# Patient Record
Sex: FEMALE | Race: WHITE | NOT HISPANIC OR LATINO | Employment: UNEMPLOYED | URBAN - METROPOLITAN AREA
[De-identification: names, ages, dates, MRNs, and addresses within clinical notes are randomized per-mention and may not be internally consistent; named-entity substitution may affect disease eponyms.]

---

## 2021-03-06 ENCOUNTER — IMMUNIZATION (OUTPATIENT)
Dept: PRIMARY CARE CLINIC | Facility: CLINIC | Age: 66
End: 2021-03-06
Payer: MEDICAID

## 2021-03-06 DIAGNOSIS — Z23 NEED FOR VACCINATION: Primary | ICD-10-CM

## 2021-03-06 PROCEDURE — 0001A PR IMMUNIZ ADMIN, SARS-COV-2 COVID-19 VACC, 30MCG/0.3ML, 1ST DOSE: ICD-10-PCS | Mod: CV19,S$GLB,, | Performed by: INTERNAL MEDICINE

## 2021-03-06 PROCEDURE — 0001A PR IMMUNIZ ADMIN, SARS-COV-2 COVID-19 VACC, 30MCG/0.3ML, 1ST DOSE: CPT | Mod: CV19,S$GLB,, | Performed by: INTERNAL MEDICINE

## 2021-03-06 PROCEDURE — 91300 PR SARS-COV- 2 COVID-19 VACCINE, NO PRSV, 30MCG/0.3ML, IM: CPT | Mod: S$GLB,,, | Performed by: INTERNAL MEDICINE

## 2021-03-06 PROCEDURE — 91300 PR SARS-COV- 2 COVID-19 VACCINE, NO PRSV, 30MCG/0.3ML, IM: ICD-10-PCS | Mod: S$GLB,,, | Performed by: INTERNAL MEDICINE

## 2021-03-06 RX ADMIN — Medication 0.3 ML: at 12:03

## 2021-03-27 ENCOUNTER — IMMUNIZATION (OUTPATIENT)
Dept: PRIMARY CARE CLINIC | Facility: CLINIC | Age: 66
End: 2021-03-27
Payer: MEDICAID

## 2021-03-27 DIAGNOSIS — Z23 NEED FOR VACCINATION: Primary | ICD-10-CM

## 2021-03-27 PROCEDURE — 91300 PR SARS-COV- 2 COVID-19 VACCINE, NO PRSV, 30MCG/0.3ML, IM: ICD-10-PCS | Mod: S$GLB,,, | Performed by: INTERNAL MEDICINE

## 2021-03-27 PROCEDURE — 0002A PR IMMUNIZ ADMIN, SARS-COV-2 COVID-19 VACC, 30MCG/0.3ML, 2ND DOSE: CPT | Mod: CV19,S$GLB,, | Performed by: INTERNAL MEDICINE

## 2021-03-27 PROCEDURE — 91300 PR SARS-COV- 2 COVID-19 VACCINE, NO PRSV, 30MCG/0.3ML, IM: CPT | Mod: S$GLB,,, | Performed by: INTERNAL MEDICINE

## 2021-03-27 PROCEDURE — 0002A PR IMMUNIZ ADMIN, SARS-COV-2 COVID-19 VACC, 30MCG/0.3ML, 2ND DOSE: ICD-10-PCS | Mod: CV19,S$GLB,, | Performed by: INTERNAL MEDICINE

## 2021-03-27 RX ADMIN — Medication 0.3 ML: at 12:03

## 2024-06-19 ENCOUNTER — TELEPHONE (OUTPATIENT)
Dept: SURGERY | Facility: CLINIC | Age: 69
End: 2024-06-19
Payer: MEDICARE

## 2024-06-19 NOTE — TELEPHONE ENCOUNTER
Returned pt call regarding message below left w/ phone staff.    Initially called pt to determine need for appt and ensure care coordination prior to appointment should she need any testing done or medical records obtained.     Pt states 6/24 will see PCP (Dr. Maurice Bianchi) to obtain test results   Cologuard test from Dr. Bianchi -- pt was called w/ results stating pt needed to make an appointment for     Constipation x 1-2 years. Takes dulcolax laxatives to have a BM. Bms 3 to 4 days/ week with taking dulcolax. Denies straining. Confirms presence of hemorrhoid. Last colonoscopy unknown -- possibly 8 years ago.     No history of colon or rectal surgery per pt.    Verbal consent obtained to request medical record from PCP:    Dr. Maurice Bianchi  [address] [place] [address]  Tel: [phone]   Fax number: [phone]      No additional needs identified at this time.    ----- Message from Viktoriya Shin sent at 6/19/2024 12:48 PM CDT -----  Regarding: Missed Call  Contact: [phone]  Returning a Missed Call         Caller:  HAYLEY MOYER [[id number]]           Returning call to:  Meena Huntleyer can be reached @:  [phone]         Nature of the call:  Missed Call

## 2024-07-02 ENCOUNTER — TELEPHONE (OUTPATIENT)
Dept: ENDOSCOPY | Facility: HOSPITAL | Age: 69
End: 2024-07-02
Payer: MEDICARE

## 2024-07-02 ENCOUNTER — TELEPHONE (OUTPATIENT)
Dept: SURGERY | Facility: CLINIC | Age: 69
End: 2024-07-02
Payer: MEDICARE

## 2024-07-02 NOTE — TELEPHONE ENCOUNTER
Placed return call to pt regarding message left below w/ phone staff.    Informed pt that an order for a colonoscopy to our Endo Schedulers to get her procedure scheduled. Pt verbalized understanding. Phone numbers listed in pt's chart from MODE Garcia MA as Endo call back numbers were provided to pt.   RN confirmed Endo schedulers' numbers via readback.    ----- Message from Sania Torres sent at 7/2/2024  2:40 PM CDT -----  Regarding: missed call  Contact: [phone]  HAYLEY MOYER calling regarding Patient Advice (message) for # returning call from Marian blas call to advise

## 2024-07-02 NOTE — TELEPHONE ENCOUNTER
Placed call to pt regarding upcoming appointment with Dr. Alcazar.    Pt requested appt d/t having a + cologuard test     LVM w/ reason for calling & callback number for our clinic; as well as, the number to the Endo scheduling department for colonoscopy scheduling.    Order to be sent to Endo schedulers per Dr. Alcazar.

## 2024-07-02 NOTE — TELEPHONE ENCOUNTER
Contacted the patient to schedule an endoscopy procedure(s) 7/2/24. The patient did not answer the call and left a voice message requesting a call back.

## 2024-07-02 NOTE — TELEPHONE ENCOUNTER
"----- Message from Madina Verma sent at 7/2/2024  8:10 AM CDT -----  Regarding: FW: Colonoscopy scheduling    ----- Message -----  From: Meena Billingsley RN  Sent: 7/2/2024   8:10 AM CDT  To: [facility]  Subject: Colonoscopy scheduling                           Procedure: Colonoscopy    Diagnosis: Screening colonoscopy; + Cologuard test    Procedure Timing: Within 4 weeks    #If within 4 weeks selected, please tom as high priority#    #If greater than 12 weeks, please select "5-12 weeks" and delay sending until 3 months prior to requested date#     Provider: Any endoscopist; preferred: Dr. Alcazar    Location: Hospital Based ([facility] 2Endo,  endo, [facility])    Additional Scheduling Information: No scheduling concerns    Prep Specifications:Standard prep    Is the patient taking a GLP-1 Agonist:no    Have you attached a patient to this message: yes  "

## 2024-07-10 ENCOUNTER — TELEPHONE (OUTPATIENT)
Dept: ENDOSCOPY | Facility: HOSPITAL | Age: 69
End: 2024-07-10
Payer: MEDICARE

## 2024-07-10 DIAGNOSIS — R19.5 POSITIVE COLORECTAL CANCER SCREENING USING COLOGUARD TEST: ICD-10-CM

## 2024-07-10 DIAGNOSIS — Z12.12 ENCOUNTER FOR SCREENING FOR COLORECTAL CANCER IN HIGH RISK PATIENT: Primary | ICD-10-CM

## 2024-07-10 DIAGNOSIS — Z12.11 ENCOUNTER FOR SCREENING FOR COLORECTAL CANCER IN HIGH RISK PATIENT: Primary | ICD-10-CM

## 2024-07-10 DIAGNOSIS — Z91.89 ENCOUNTER FOR SCREENING FOR COLORECTAL CANCER IN HIGH RISK PATIENT: Primary | ICD-10-CM

## 2024-07-10 NOTE — TELEPHONE ENCOUNTER
"----- Message from Madina Verma sent at 7/2/2024  8:10 AM CDT -----  Regarding: FW: Colonoscopy scheduling    ----- Message -----  From: Meena Billingsley RN  Sent: 7/2/2024   8:10 AM CDT  To: [facility] Patients  Subject: Colonoscopy scheduling                           Procedure: Colonoscopy    Diagnosis: Screening colonoscopy; + Cologuard test    Procedure Timing: Within 4 weeks    #If within 4 weeks selected, please tom as high priority#    #If greater than 12 weeks, please select "5-12 weeks" and delay sending until 3 months prior to requested date#     Provider: Any endoscopist; preferred: Dr. Alcazar    Location: Hospital Based ([facility] [place] [place] 2Endo,  endo, [facility])    Additional Scheduling Information: No scheduling concerns    Prep Specifications:Standard prep    Is the patient taking a GLP-1 Agonist:no    Have you attached a patient to this message: yes  "

## 2024-07-10 NOTE — TELEPHONE ENCOUNTER
Contacted the patient to schedule an endoscopy procedure(s) 7/10/24. The patient did not answer the call and left a voice message requesting a call back.

## 2024-07-10 NOTE — TELEPHONE ENCOUNTER
Spoke to pt to schedule procedure(s) Colonoscopy       Physician to perform procedure(s) Dr. MODE Alcazar  Date of Procedure (s) 8/1/24  Arrival Time 10:00 AM  Time of Procedure(s) 11:00 AM   Location of Procedure(s) [facility] 4th Floor  Type of Rx Prep sent to patient: Miralax  Instructions provided to patient via [facility]    Patient was informed on the following information and verbalized understanding. Screening questionnaire reviewed with patient and complete. If procedure requires anesthesia, a responsible adult needs to be present to accompany the patient home, patient cannot drive after receiving anesthesia. Appointment details are tentative, especially check-in time. Patient will receive a prep-op call 7 days prior to confirm check-in time for procedure. If applicable the patient should contact their pharmacy to verify Rx for procedure prep is ready for pick-up. Patient was advised to call the scheduling department at [phone] if pharmacy states no Rx is available. Patient was advised to call the endoscopy scheduling department if any questions or concerns arise.      [facility] Scheduling Department

## 2024-07-24 ENCOUNTER — TELEPHONE (OUTPATIENT)
Dept: ENDOSCOPY | Facility: HOSPITAL | Age: 69
End: 2024-07-24
Payer: MEDICARE

## 2024-07-24 ENCOUNTER — PATIENT MESSAGE (OUTPATIENT)
Dept: ENDOSCOPY | Facility: HOSPITAL | Age: 69
End: 2024-07-24
Payer: MEDICARE

## 2024-07-24 NOTE — TELEPHONE ENCOUNTER
Spoke to pt for precall to confirm scheduled procedure(s) Colonoscopy       Date of Procedure (s)  verified 8/1/24  Arrival Time 10:00 AM verified.  Location of Procedure(s) [facility] 4th Floor verified.    NPO status reinforced. Ok to continue clear liquids up until 4 hours prior to the Endoscopy procedure.   Pt confirmed receipt of Rx prep and prep instructions.  Instructions provided to patient via Copy in hand-another set placed in portal  Pt confirmed ride home after procedure.   If procedure requires anesthesia, a responsible adult needs to be present to accompany the patient home, patient cannot drive after receiving anesthesia.    Patient was informed on the following information and verbalized understanding. Precall Screening questionnaire reviewed  and completed with patient. Appointment details are tentative, including check-in time.  If you begin taking any blood thinning medications, injectable weight loss/diabetes medications (other than insulin) , or Adipex (Phentermine) please contact the endoscopy scheduling department listed below as soon as possible.  Patient was advised to call the endoscopy scheduling department if any questions or concerns arise. Pt verbalized understanding.     [facility] Scheduling Department

## 2024-07-30 ENCOUNTER — TELEPHONE (OUTPATIENT)
Dept: ENDOSCOPY | Facility: HOSPITAL | Age: 69
End: 2024-07-30
Payer: MEDICARE

## 2024-07-30 NOTE — TELEPHONE ENCOUNTER
Received patient's call. Patient states she needs to cancel her colonoscopy at this time. Her brother is at  on hospice. Patient removed from 8/1/24 schedule. Patient states she will call back to reschedule when ready.